# Patient Record
Sex: FEMALE | Race: ASIAN | URBAN - METROPOLITAN AREA
[De-identification: names, ages, dates, MRNs, and addresses within clinical notes are randomized per-mention and may not be internally consistent; named-entity substitution may affect disease eponyms.]

---

## 2018-10-15 ENCOUNTER — OFFICE VISIT (OUTPATIENT)
Dept: FAMILY MEDICINE CLINIC | Age: 23
End: 2018-10-15

## 2018-10-15 VITALS
OXYGEN SATURATION: 94 % | RESPIRATION RATE: 20 BRPM | HEIGHT: 60 IN | SYSTOLIC BLOOD PRESSURE: 115 MMHG | WEIGHT: 117.6 LBS | DIASTOLIC BLOOD PRESSURE: 79 MMHG | TEMPERATURE: 98.9 F | HEART RATE: 86 BPM | BODY MASS INDEX: 23.09 KG/M2

## 2018-10-15 DIAGNOSIS — J45.20 MILD INTERMITTENT ASTHMA WITHOUT COMPLICATION: Primary | ICD-10-CM

## 2018-10-15 RX ORDER — MONTELUKAST SODIUM 10 MG/1
10 TABLET ORAL DAILY
COMMUNITY

## 2018-10-15 RX ORDER — NORGESTIMATE AND ETHINYL ESTRADIOL 7DAYSX3 28
KIT ORAL
COMMUNITY

## 2018-10-15 RX ORDER — ALBUTEROL SULFATE 90 UG/1
AEROSOL, METERED RESPIRATORY (INHALATION)
COMMUNITY

## 2018-10-15 NOTE — MR AVS SNAPSHOT
[address] [facility] 
 
 
 [address] [facility] [phone] 
[phone] Patient: Phoebe Church MRN: [id number] :1995 Visit Information Date & Time Provider Department Dept. Phone Encounter #  
 10/15/2018 10:30 AM Gerhardt Mast, Sahil Arnett [id number] Follow-up Instructions Return for CPE. Your Appointments 10/15/2018 10:30 AM  
New Patient with Gerhardt Mast[place]  
[address] [address]) Appt Note: Establish care, bring ins, id, arrive 30 mins prior; lvm confirming appt for mon and to arrive 30 min early and bring valid id and insurance card 10/12/18 1013am [place]; Appt conf 10/12/18 clb [address] [facility] [address] [facility] [address] [facility]  
  
   
 [address] Upcoming Health Maintenance Date Due  
 HPV Age 9Y-34Y (1 of 3 - Female 3 Dose Series) 2007 DTaP/Tdap/Td series (1 - Tdap) 2017 PAP AKA CERVICAL CYTOLOGY 2017 Influenza Age 5 to Adult 2018 Allergies as of 10/15/2018  Review Complete On: 10/15/2018 By: Gerhardt Mast, MD  
 No Known Allergies Current Immunizations  Never Reviewed No immunizations on file. Not reviewed this visit You Were Diagnosed With   
  
 Codes Comments Mild intermittent asthma without complication    -  Primary ICD-10-CM: J45.20 ICD-9-CM: 493.90 Vitals BP Pulse Temp Resp Height(growth percentile) Weight(growth percentile) 115/79 86 98.9 °F (37.2 °C) (Oral) 20 5' (1.524 m) 117 lb 9.6 oz (53.3 kg) LMP SpO2 BMI OB Status Smoking Status 2018 94% 22.97 kg/m2 Having regular periods Never Smoker BMI and BSA Data Body Mass Index Body Surface Area  
 22.97 kg/m 2 1.5 m 2 Preferred Pharmacy Pharmacy Name Phone [id number] [address] [place] [address] [phone] Your Updated Medication List  
  
   
This list is accurate as of 10/15/18 10:23 AM.  Always use your most recent med list.  
  
  
  
  
 albuterol 90 mcg/actuation inhaler Commonly known as:  PROVENTIL HFA, VENTOLIN HFA, PROAIR HFA Take  by inhalation. montelukast 10 mg tablet Commonly known as:  SINGULAIR Take 10 mg by mouth daily. TRI-SPRINTEC (28) 0.18/0.215/0.25 mg-35 mcg (28) Tab Generic drug:  norgestimate-ethinyl estradiol Take  by mouth. Follow-up Instructions Return for CPE. Patient Instructions Ada [id number]  
[phone] Introducing [facility]! Gris Velasquez introduces [employer] patient portal. Now you can access parts of your medical record, email your doctor's office, and request medication refills online. 1. In your internet browser, go to [email or web address]. [email or web address] 2. Click on the First Time User? Click Here link in the Sign In box. You will see the New Member Sign Up page. 3. Enter your [employer] Access Code exactly as it appears below. You will not need to use this code after youve completed the sign-up process. If you do not sign up before the expiration date, you must request a new code. · [employer] Access Code: [email or web address] Expires: 1/13/2019 10:00 AM 
 
4. Enter the last four digits of your Social Security Number (xxxx) and Date of Birth (mm/dd/yyyy) as indicated and click Submit. You will be taken to the next sign-up page. 5. Create a Consign[employer] ID. This will be your [employer] login ID and cannot be changed, so think of one that is secure and easy to remember. 6. Create a [employer] password. You can change your password at any time. 7. Enter your Password Reset Question and Answer. This can be used at a later time if you forget your password. 8. Enter your e-mail address. You will receive e-mail notification when new information is available in [address] 9. Click Sign Up. You can now view and download portions of your medical record. 10. Click the Download Summary menu link to download a portable copy of your medical information. If you have questions, please visit the Frequently Asked Questions section of the [employer] website. Remember, [employer] is NOT to be used for urgent needs. For medical emergencies, dial 911. Now available from your iPhone and Android! Please provide this summary of care documentation to your next provider. Your primary care clinician is listed as Real Pears. If you have any questions after today's visit, please call [phone].

## 2018-10-15 NOTE — PROGRESS NOTES
History of Present Illness Parvin Rudolph is a 26 y.o. female who presents today for management of 
 
Chief Complaint Patient presents with Poppy Oh Establish Care Patient is here to establish care. Asthma Review: 
The patient is being seen for asthma, not currently in exacerbation. Asthma symptoms occur: infrequently. Wheezing when present is described as mild and easily relieved with rescue bronchodilator. Current limitations in activity from asthma: none. Number of days of school or work missed in the last month: 0. Frequency of use of quick-relief meds: rarely. Regimen compliance: The patient reports adherence to this regimen. Patient does not smoke cigarettes. Past Medical History Past Medical History:  
Diagnosis Date  Asthma Surgical History Past Surgical History:  
Procedure Laterality Date  HX WISDOM TEETH EXTRACTION Current Medications Current Outpatient Prescriptions Medication Sig  
 montelukast (SINGULAIR) 10 mg tablet Take 10 mg by mouth daily.  albuterol (PROVENTIL HFA, VENTOLIN HFA, PROAIR HFA) 90 mcg/actuation inhaler Take  by inhalation.  norgestimate-ethinyl estradiol (TRI-SPRINTEC, 28,) 0.18/0.215/0.25 mg-35 mcg (28) tab Take  by mouth. No current facility-administered medications for this visit. Allergies/Drug Reactions No Known Allergies Family History Family History Problem Relation Age of Onset  Glaucoma Mother Poppy Oh Cataract Mother  No Known Problems Sister  No Known Problems Brother  No Known Problems Brother Social History Social History Social History  Marital status: SINGLE Spouse name: N/A  
 Number of children: N/A  
 Years of education: N/A Occupational History  Activities    
 
Social History Main Topics  Smoking status: Never Smoker  Smokeless tobacco: Never Used  Alcohol use 2.4 oz/week  
  4 Glasses of wine per week  Drug use:  No  
  Sexual activity: Not Currently Other Topics Concern  Not on file Social History Narrative  No narrative on file Health Maintenance Topic Date Due  
 HPV Age 9Y-34Y (1 of 3 - Female 3 Dose Series) 06/07/2007  DTaP/Tdap/Td series (1 - Tdap) 06/07/2017  PAP AKA CERVICAL CYTOLOGY  06/07/2017  Influenza Age 5 to Adult  08/01/2018 There is no immunization history on file for this patient. Review of Systems General ROS: negative for - chills, fatigue or fever Psychological ROS: negative Ophthalmic ROS: negative ENT ROS: positive for - sinus pain Respiratory ROS: no cough, shortness of breath, or wheezing Cardiovascular ROS: no chest pain or dyspnea on exertion Gastrointestinal ROS: no abdominal pain, change in bowel habits, or black or bloody stools Genito-Urinary ROS: no dysuria, trouble voiding, or hematuria Musculoskeletal ROS: negative Neurological ROS: no TIA or stroke symptoms Dermatological ROS: negative Physical Exam 
Vital signs:  
Vitals:  
 10/15/18 1007 BP: 115/79 Pulse: 86 Resp: 20 Temp: 98.9 °F (37.2 °C) TempSrc: Oral  
SpO2: 94% Weight: 117 lb 9.6 oz (53.3 kg) Height: 5' (1.524 m) General: alert, oriented, not in distress Head: scalp normal, atraumatic, (+) mild maxillary sinus pressure Eyes: pupils are equal and reactive, full and intact EOM's 
Ears: patent ear canal, intact tympanic membrane Nose: normal turbinates, no congestion or discharge 
Lips/Mouth: moist lips and buccal mucosa, non-enlarged tonsils, pink throat Neck: supple, no JVD, no lymphadenopathy, non-palpable thyroid Chest/Lungs: clear breath sounds, no wheezing or crackles Heart: normal rate, regular rhythm, no murmur Extremities: no focal deformities, no edema Skin: no active skin lesions Assessment/Plan: 1. Mild intermittent asthma without complication 
- controlled 
- continue montelukast and prn albuterol 2.  Uses OCP 
 - provided information about [facility]. Patient will call them to establish care. 3. URI 
- almost resolved Follow-up Disposition: 
Return for CPE. I have discussed the diagnosis with the patient and the intended plan as seen in the above orders. The patient has received an after-visit summary and questions were answered concerning future plans. I have discussed medication side effects and warnings with the patient as well. I have reviewed the plan of care with the patient, accepted their input and they are in agreement with the treatment goals. Mine San MD 
October 15, 2018

## 2018-10-15 NOTE — PROGRESS NOTES
1. Have you been to the ER, urgent care clinic since your last visit? Hospitalized since your last visit? No 
 
2. Have you seen or consulted any other health care providers outside of the [address] [place] [address] since your last visit? Include any pap smears or colon screening.  No

## 2018-11-07 ENCOUNTER — HOSPITAL ENCOUNTER (OUTPATIENT)
Dept: LAB | Age: 23
Discharge: HOME | End: 2018-11-07
Payer: COMMERCIAL

## 2018-11-07 ENCOUNTER — OFFICE VISIT (OUTPATIENT)
Dept: FAMILY MEDICINE CLINIC | Age: 23
End: 2018-11-07

## 2018-11-07 VITALS
SYSTOLIC BLOOD PRESSURE: 117 MMHG | TEMPERATURE: 97.3 F | DIASTOLIC BLOOD PRESSURE: 80 MMHG | OXYGEN SATURATION: 99 % | RESPIRATION RATE: 20 BRPM | HEART RATE: 72 BPM | BODY MASS INDEX: 23.4 KG/M2 | HEIGHT: 60 IN | WEIGHT: 119.2 LBS

## 2018-11-07 DIAGNOSIS — Z00.00 ROUTINE GENERAL MEDICAL EXAMINATION AT A HEALTH CARE FACILITY: ICD-10-CM

## 2018-11-07 DIAGNOSIS — Z11.3 SCREENING EXAMINATION FOR STD (SEXUALLY TRANSMITTED DISEASE): ICD-10-CM

## 2018-11-07 DIAGNOSIS — Z00.00 ROUTINE GENERAL MEDICAL EXAMINATION AT A HEALTH CARE FACILITY: Primary | ICD-10-CM

## 2018-11-07 LAB
ALBUMIN SERPL-MCNC: 3.9 G/DL (ref 3.4–5)
ALBUMIN/GLOB SERPL: 1.2 {RATIO} (ref 0.8–1.7)
ALP SERPL-CCNC: 61 U/L (ref 45–117)
ALT SERPL-CCNC: 15 U/L (ref 13–56)
ANION GAP SERPL CALC-SCNC: 6 MMOL/L (ref 3–18)
APPEARANCE UR: CLEAR
AST SERPL-CCNC: 8 U/L (ref 15–37)
BACTERIA URNS QL MICRO: NEGATIVE /HPF
BASOPHILS # BLD: 0.1 K/UL (ref 0–0.1)
BASOPHILS NFR BLD: 1 % (ref 0–2)
BILIRUB SERPL-MCNC: 0.2 MG/DL (ref 0.2–1)
BILIRUB UR QL: NEGATIVE
BUN SERPL-MCNC: 15 MG/DL (ref 7–18)
BUN/CREAT SERPL: 24 (ref 12–20)
CALCIUM SERPL-MCNC: 8.9 MG/DL (ref 8.5–10.1)
CHLORIDE SERPL-SCNC: 112 MMOL/L (ref 100–108)
CHOLEST SERPL-MCNC: 146 MG/DL
CO2 SERPL-SCNC: 25 MMOL/L (ref 21–32)
COLOR UR: YELLOW
CREAT SERPL-MCNC: 0.63 MG/DL (ref 0.6–1.3)
DIFFERENTIAL METHOD BLD: ABNORMAL
EOSINOPHIL # BLD: 0.3 K/UL (ref 0–0.4)
EOSINOPHIL NFR BLD: 4 % (ref 0–5)
EPITH CASTS URNS QL MICRO: NORMAL /LPF (ref 0–5)
ERYTHROCYTE [DISTWIDTH] IN BLOOD BY AUTOMATED COUNT: 13.1 % (ref 11.6–14.5)
EST. AVERAGE GLUCOSE BLD GHB EST-MCNC: NORMAL MG/DL
GLOBULIN SER CALC-MCNC: 3.2 G/DL (ref 2–4)
GLUCOSE SERPL-MCNC: 83 MG/DL (ref 74–99)
GLUCOSE UR STRIP.AUTO-MCNC: NEGATIVE MG/DL
HBA1C MFR BLD: 4.9 % (ref 4.2–5.6)
HCT VFR BLD AUTO: 41.3 % (ref 35–45)
HDLC SERPL-MCNC: 57 MG/DL (ref 40–60)
HDLC SERPL: 2.6 {RATIO} (ref 0–5)
HGB BLD-MCNC: 13.1 G/DL (ref 12–16)
HGB UR QL STRIP: NEGATIVE
KETONES UR QL STRIP.AUTO: NEGATIVE MG/DL
LDLC SERPL CALC-MCNC: 73.6 MG/DL (ref 0–100)
LEUKOCYTE ESTERASE UR QL STRIP.AUTO: ABNORMAL
LIPID PROFILE,FLP: NORMAL
LYMPHOCYTES # BLD: 1.1 K/UL (ref 0.9–3.6)
LYMPHOCYTES NFR BLD: 19 % (ref 21–52)
MCH RBC QN AUTO: 29.9 PG (ref 24–34)
MCHC RBC AUTO-ENTMCNC: 31.7 G/DL (ref 31–37)
MCV RBC AUTO: 94.3 FL (ref 74–97)
MONOCYTES # BLD: 0.3 K/UL (ref 0.05–1.2)
MONOCYTES NFR BLD: 5 % (ref 3–10)
NEUTS SEG # BLD: 4 K/UL (ref 1.8–8)
NEUTS SEG NFR BLD: 71 % (ref 40–73)
NITRITE UR QL STRIP.AUTO: NEGATIVE
PH UR STRIP: 6 [PH] (ref 5–8)
PLATELET # BLD AUTO: 290 K/UL (ref 135–420)
PMV BLD AUTO: 10.6 FL (ref 9.2–11.8)
POTASSIUM SERPL-SCNC: 4.2 MMOL/L (ref 3.5–5.5)
PROT SERPL-MCNC: 7.1 G/DL (ref 6.4–8.2)
PROT UR STRIP-MCNC: NEGATIVE MG/DL
RBC # BLD AUTO: 4.38 M/UL (ref 4.2–5.3)
RBC #/AREA URNS HPF: NORMAL /HPF (ref 0–5)
SODIUM SERPL-SCNC: 143 MMOL/L (ref 136–145)
SP GR UR REFRACTOMETRY: 1.02 (ref 1–1.03)
TRIGL SERPL-MCNC: 77 MG/DL (ref ?–150)
UROBILINOGEN UR QL STRIP.AUTO: 0.2 EU/DL (ref 0.2–1)
VLDLC SERPL CALC-MCNC: 15.4 MG/DL
WBC # BLD AUTO: 5.6 K/UL (ref 4.6–13.2)
WBC URNS QL MICRO: NORMAL /HPF (ref 0–4)

## 2018-11-07 PROCEDURE — 86803 HEPATITIS C AB TEST: CPT | Performed by: INTERNAL MEDICINE

## 2018-11-07 PROCEDURE — 85025 COMPLETE CBC W/AUTO DIFF WBC: CPT | Performed by: INTERNAL MEDICINE

## 2018-11-07 PROCEDURE — 36415 COLL VENOUS BLD VENIPUNCTURE: CPT | Performed by: INTERNAL MEDICINE

## 2018-11-07 PROCEDURE — 86695 HERPES SIMPLEX TYPE 1 TEST: CPT | Performed by: INTERNAL MEDICINE

## 2018-11-07 PROCEDURE — 87389 HIV-1 AG W/HIV-1&-2 AB AG IA: CPT | Performed by: INTERNAL MEDICINE

## 2018-11-07 PROCEDURE — 86780 TREPONEMA PALLIDUM: CPT | Performed by: INTERNAL MEDICINE

## 2018-11-07 PROCEDURE — 80053 COMPREHEN METABOLIC PANEL: CPT | Performed by: INTERNAL MEDICINE

## 2018-11-07 PROCEDURE — 81001 URINALYSIS AUTO W/SCOPE: CPT | Performed by: INTERNAL MEDICINE

## 2018-11-07 PROCEDURE — 86705 HEP B CORE ANTIBODY IGM: CPT | Performed by: INTERNAL MEDICINE

## 2018-11-07 PROCEDURE — 83036 HEMOGLOBIN GLYCOSYLATED A1C: CPT | Performed by: INTERNAL MEDICINE

## 2018-11-07 PROCEDURE — 80061 LIPID PANEL: CPT | Performed by: INTERNAL MEDICINE

## 2018-11-07 NOTE — PATIENT INSTRUCTIONS

## 2018-11-07 NOTE — PROGRESS NOTES
1. Have you been to the ER, urgent care clinic since your last visit? Hospitalized since your last visit? No 
 
2. Have you seen or consulted any other health care providers outside of the [address] [place] [address] since your last visit? Include any pap smears or colon screening.  No

## 2018-11-07 NOTE — PROGRESS NOTES
ANNUAL PHYSICAL EXAMINATION History of Present Illness Lm Gardner is a 26 y.o. female who presents today for management of 
 
Chief Complaint Patient presents with [facility]  
 
Patient here for routine exam.  Current Complaints: none. Gynecologic History Patient's last menstrual period was Patient's last menstrual period was 10/30/2018. Jake Salvador Contraception: OCP (Oral Contraceptive Pills) Last Pap: last week Results: not yet resulted Obstetric History : 0 Para: 0 
AB: 0 Health Maintenance Colon cancer: due at age 49 Dyslipidemia: due Diabetes mellitus: due Influenza vaccine: uptodate Pneumococcal vaccine: not indicated Tdap: uptodate Herpes Zoster vaccine: due at age 62 Hep B vaccine: not indicated   
Weight:  Body mass index is Estimated body mass index is Body mass index is 23.28 kg/m². Jake Salvador Diet: generally healthy Exercise: yes Seatbelt: yes Sunscreen: yes Dentist: no 
 
 
Past Medical History Past Medical History:  
Diagnosis Date  Asthma Surgical History Past Surgical History:  
Procedure Laterality Date  HX WISDOM TEETH EXTRACTION Current Medications Current Outpatient Medications Medication Sig  
 montelukast (SINGULAIR) 10 mg tablet Take 10 mg by mouth daily.  albuterol (PROVENTIL HFA, VENTOLIN HFA, PROAIR HFA) 90 mcg/actuation inhaler Take  by inhalation.  norgestimate-ethinyl estradiol (TRI-SPRINTEC, 28,) 0.18/0.215/0.25 mg-35 mcg (28) tab Take  by mouth. No current facility-administered medications for this visit. Allergies/Drug Reactions No Known Allergies Family History Family History Problem Relation Age of Onset  Glaucoma Mother [facility] Cataract Mother  No Known Problems Sister  No Known Problems Brother  No Known Problems Brother Social History Social History Socioeconomic History  Marital status: SINGLE Spouse name: Not on file  Number of children: Not on file  Years of education: Not on file  Highest education level: Not on file Social Needs  Financial resource strain: Not on file  Food insecurity - worry: Not on file  Food insecurity - inability: Not on file  Transportation needs - medical: Not on file  Transportation needs - non-medical: Not on file Occupational History  Occupation: Activities  Comment: [employer] Tobacco Use  Smoking status: Never Smoker  Smokeless tobacco: Never Used Substance and Sexual Activity  Alcohol use: Yes Alcohol/week: 2.4 oz Types: 4 Glasses of wine per week  Drug use: No  
 Sexual activity: Not Currently Other Topics Concern  Not on file Social History Narrative  Not on file Health Maintenance Topic Date Due  
 HPV Age 9Y-34Y (1 - Female 3-dose series) 06/07/2007  Pneumococcal 19-64 Medium Risk (1 of 1 - PPSV23) 06/07/2015  Influenza Age 5 to Adult  04/21/2019 (Originally 8/1/2018)  DTaP/Tdap/Td series (1 - Tdap) 11/07/2019 (Originally 6/7/2017)  PAP AKA CERVICAL CYTOLOGY  10/29/2021 Review of Systems General ROS: negative for - chills, fatigue, fever, night sweats, weight gain or weight loss Psychological ROS: negative for - anxiety or depression Ophthalmic ROS: negative for - blurry vision, decreased vision, double vision or dry eyes ENT ROS: negative Allergy and Immunology ROS: positive for - seasonal allergies Hematological and Lymphatic ROS: negative for - bleeding problems or bruising Endocrine ROS: negative for - galactorrhea, hot flashes, palpitations or skin changes Breast ROS: negative for breast lumps Respiratory ROS: no cough, shortness of breath, or wheezing Cardiovascular ROS: no chest pain or dyspnea on exertion Gastrointestinal ROS: no abdominal pain, change in bowel habits, or black or bloody stools Genito-Urinary ROS: no dysuria, trouble voiding, or hematuria Musculoskeletal ROS: intermittent back pain Neurological ROS: negative for - dizziness, headaches or numbness/tingling Dermatological ROS: negative Visual Acuity Screening Right eye Left eye Both eyes Without correction: 20/15 20/13 20/15 With correction:     
 
 
 
Physical Exam 
Vital signs:  
Vitals:  
 11/07/18 0759 BP: 117/80 Pulse: 72 Resp: 20 Temp: 97.3 °F (36.3 °C) TempSrc: Oral  
SpO2: 99% Weight: 119 lb 3.2 oz (54.1 kg) Height: 5' (1.524 m) General: alert, oriented, not in distress Head: scalp normal, atraumatic Eyes: pupils are equal and reactive, full and intact EOM's 
Ears: patent ear canal, intact tympanic membrane Nose: normal turbinates, no congestion or discharge 
Lips/Mouth: moist lips and buccal mucosa, non-enlarged tonsils, pink throat Neck: supple, no JVD, no lymphadenopathy, non-palpable thyroid Chest/Lungs: clear breath sounds, no wheezing or crackles Breasts: right breast normal without mass, skin or nipple changes or axillary nodes, left breast normal without mass, skin or nipple changes or axillary nodes Heart: normal rate, regular rhythm, no murmur Abdomen: soft, non-distended, non-tender, normal bowel sounds, no organomegaly, no masses Extremities: no focal deformities, no edema Skin: no active skin lesions Laboratory/Tests: 
No labs available for review Assessment/Plan: ICD-10-CM ICD-9-CM 1. Routine general medical examination at a health care facility Z00.00 V70.0 CBC WITH AUTOMATED DIFF  
   HEMOGLOBIN A1C WITH EAG  
   LIPID PANEL  
   METABOLIC PANEL, COMPREHENSIVE URINALYSIS W/ RFLX MICROSCOPIC 2. Screening examination for STD (sexually transmitted disease) Z11.3 V74.5 HEPATITIS B SURFACE ANTIGEN REFLEX TO CONFIRMATION  
   HEPATITIS B CORE AB, IGM  
   HEPATITIS C AB  
   HIV 1/2 AG/AB, 4TH GENERATION,W RFLX CONFIRM  
   HSV 1 AND 2 ABS, IGM T PALLIDUM SCREEN W/REFLEX continue current healthy lifestyle patterns 
counseled on breast self exam and STD prevention Follow-up Disposition: 
Return in about 1 year (around 11/7/2019), or as needed, for CPE. I have discussed the diagnosis with the patient and the intended plan as seen in the above orders. The patient has received an after-visit summary and questions were answered concerning future plans. I have discussed medication side effects and warnings with the patient as well. I have reviewed the plan of care with the patient, accepted their input and they are in agreement with the treatment goals. Alison Luna MD 
November 7, 2018

## 2018-11-08 LAB
HBV CORE IGM SER QL: NEGATIVE
HCV AB SER IA-ACNC: 0.07 INDEX
HCV AB SERPL QL IA: NEGATIVE
HCV COMMENT,HCGAC: NORMAL
HIV 1+2 AB+HIV1 P24 AG SERPL QL IA: NONREACTIVE
HIV12 RESULT COMMENT, HHIVC: NORMAL
T PALLIDUM AB SER QL IA: NEGATIVE

## 2018-11-09 LAB
HSV1 IGM TITR SER IF: NORMAL TITER
HSV2 IGM TITR SER IF: NORMAL TITER